# Patient Record
Sex: FEMALE | Race: WHITE | URBAN - METROPOLITAN AREA
[De-identification: names, ages, dates, MRNs, and addresses within clinical notes are randomized per-mention and may not be internally consistent; named-entity substitution may affect disease eponyms.]

---

## 2017-01-26 ENCOUNTER — OFFICE VISIT (OUTPATIENT)
Dept: PULMONOLOGY | Age: 82
End: 2017-01-26

## 2017-01-26 VITALS
BODY MASS INDEX: 25.52 KG/M2 | RESPIRATION RATE: 16 BRPM | WEIGHT: 130 LBS | TEMPERATURE: 98.4 F | HEIGHT: 60 IN | HEART RATE: 68 BPM | DIASTOLIC BLOOD PRESSURE: 64 MMHG | SYSTOLIC BLOOD PRESSURE: 120 MMHG | OXYGEN SATURATION: 95 %

## 2017-01-26 DIAGNOSIS — R91.8 LUNG NODULES: ICD-10-CM

## 2017-01-26 DIAGNOSIS — J90 PLEURAL EFFUSION: Primary | ICD-10-CM

## 2017-01-26 PROCEDURE — 99213 OFFICE O/P EST LOW 20 MIN: CPT | Performed by: INTERNAL MEDICINE

## 2017-01-26 RX ORDER — ALBUTEROL SULFATE 2.5 MG/3ML
2.5 SOLUTION RESPIRATORY (INHALATION) EVERY 6 HOURS PRN
COMMUNITY

## 2017-10-18 ENCOUNTER — HOSPITAL ENCOUNTER (OUTPATIENT)
Dept: CT IMAGING | Age: 82
Discharge: HOME | End: 2017-10-18
Attending: INTERNAL MEDICINE | Admitting: INTERNAL MEDICINE

## 2017-10-18 DIAGNOSIS — R91.1 SOLITARY PULMONARY NODULE: ICD-10-CM

## 2017-10-18 DIAGNOSIS — R91.8 LUNG NODULES: ICD-10-CM

## 2017-12-11 ENCOUNTER — OFFICE VISIT (OUTPATIENT)
Dept: PULMONOLOGY | Age: 82
End: 2017-12-11

## 2017-12-11 VITALS
SYSTOLIC BLOOD PRESSURE: 90 MMHG | HEIGHT: 60 IN | RESPIRATION RATE: 16 BRPM | OXYGEN SATURATION: 94 % | TEMPERATURE: 97.7 F | HEART RATE: 60 BPM | DIASTOLIC BLOOD PRESSURE: 40 MMHG

## 2017-12-11 DIAGNOSIS — R91.8 LUNG NODULES: ICD-10-CM

## 2017-12-11 DIAGNOSIS — J90 PLEURAL EFFUSION: Primary | ICD-10-CM

## 2017-12-11 PROCEDURE — 99213 OFFICE O/P EST LOW 20 MIN: CPT | Performed by: INTERNAL MEDICINE

## 2017-12-11 RX ORDER — QUETIAPINE FUMARATE 25 MG/1
12.5 TABLET, FILM COATED ORAL 2 TIMES DAILY
COMMUNITY

## 2017-12-11 RX ORDER — POTASSIUM CHLORIDE 750 MG/1
10 CAPSULE, EXTENDED RELEASE ORAL 2 TIMES DAILY
Status: ON HOLD | COMMUNITY
End: 2018-05-02 | Stop reason: SDUPTHER

## 2017-12-11 RX ORDER — FUROSEMIDE 40 MG/1
40 TABLET ORAL 2 TIMES DAILY
COMMUNITY
End: 2017-12-21 | Stop reason: DRUGHIGH

## 2017-12-11 RX ORDER — HALOPERIDOL 5 MG/ML
INJECTION INTRAMUSCULAR EVERY 6 HOURS PRN
COMMUNITY
End: 2018-01-29 | Stop reason: ALTCHOICE

## 2017-12-11 RX ORDER — GUAIFENESIN AND CODEINE PHOSPHATE 100; 10 MG/5ML; MG/5ML
5 SOLUTION ORAL 3 TIMES DAILY PRN
COMMUNITY

## 2017-12-11 RX ORDER — LOPERAMIDE HYDROCHLORIDE 2 MG/1
2 CAPSULE ORAL 4 TIMES DAILY PRN
Status: ON HOLD | COMMUNITY
End: 2018-05-07 | Stop reason: HOSPADM

## 2017-12-11 NOTE — PROGRESS NOTES
Chief complaint  This is a 80y.o. year old female  who presents with a chief complaint of   Chief Complaint   Patient presents with    Follow-up     ct       HPI  80-year-old woman with pleural effusion and lung nodules presents for follow-up. She is not a reliable historian. She tells me she is \"breathing through my stomach. \" She reports rare cough and sneezing. She denies lightheadedness. She is wheelchair bound. She requires help with all ADLs. Past Medical History:   Diagnosis Date    Atrial fibrillation ([facility] [address]     CAD (coronary artery disease)     CVA (cerebral vascular accident) ([facility] [address]     Depression     Hypertension    Dementia     Past Surgical History   History reviewed. No pertinent past surgical history. Current Outpatient Prescriptions   Medication Sig Dispense Refill    haloperidol lactate (HALDOL) 5 MG/ML injection Inject into the muscle every 6 hours as needed for Agitation      loperamide (IMODIUM) 2 MG capsule Take 2 mg by mouth 4 times daily as needed for Diarrhea      guaiFENesin-codeine (TUSSI-ORGANIDIN NR) 100-10 MG/5ML syrup Take 5 mLs by mouth 3 times daily as needed for Cough .       furosemide (LASIX) 40 MG tablet Take 40 mg by mouth 2 times daily      potassium chloride (MICRO-K) 10 MEQ extended release capsule Take 10 mEq by mouth 2 times daily      QUEtiapine (SEROQUEL) 25 MG tablet Take 25 mg by mouth 2 times daily      ipratropium (ATROVENT) 0.02 % nebulizer solution Take 0.5 mg by nebulization 4 times daily Every 2 hours as needed      albuterol (PROVENTIL) (2.5 MG/3ML) 0.083% nebulizer solution Take 2.5 mg by nebulization every 6 hours as needed for Wheezing      acetaminophen (TYLENOL) 325 MG tablet Take 650 mg by mouth every 4 hours as needed for Pain      bisacodyl (DULCOLAX) 10 MG suppository Place 10 mg rectally daily      bisacodyl (DULCOLAX) 5 MG EC tablet Take 5 mg by mouth daily as needed for Constipation      LORazepam (ATIVAN) 0.5 MG tablet Take 0.5 mg by mouth every 6 hours as needed for Anxiety      ondansetron (ZOFRAN-ODT) 4 MG disintegrating tablet Take 4 mg by mouth every 8 hours as needed for Nausea or Vomiting      clopidogrel (PLAVIX) 75 MG tablet Take 75 mg by mouth daily      digoxin (LANOXIN) 125 MCG tablet Take 125 mcg by mouth daily      docusate sodium (COLACE) 100 MG capsule Take 100 mg by mouth 2 times daily      DULoxetine (CYMBALTA) 30 MG extended release capsule Take 30 mg by mouth daily      famotidine (PEPCID) 20 MG tablet Take 20 mg by mouth 2 times daily      ferrous sulfate 325 (65 FE) MG tablet Take 325 mg by mouth daily (with breakfast)      lisinopril (PRINIVIL;ZESTRIL) 2.5 MG tablet Take 2.5 mg by mouth daily      nortriptyline (PAMELOR) 10 MG capsule Take 10 mg by mouth nightly      oxyCODONE (ROXICODONE) 5 MG immediate release tablet Take 5 mg by mouth every 4 hours as needed for Pain      polyethylene glycol (GLYCOLAX) powder Take 17 g by mouth daily      potassium chloride (KLOR-CON M) 10 MEQ extended release tablet Take 10 mEq by mouth 2 times daily      senna (SENOKOT) 8.6 MG tablet Take 1 tablet by mouth daily      SENSI-CARE SEPTI-SOFT (SEPTI SOFT) LIQD Apply topically as needed       No current facility-administered medications for this visit. Allergies   Allergen Reactions    Levofloxacin      Redness, itching       FAMILY HISTORY  History reviewed. No pertinent family history. Social History     Social History    Marital status:      Spouse name: N/A    Number of children: N/A    Years of education: N/A     Occupational History    Not on file. Social History Main Topics    Smoking status: Never Smoker    Smokeless tobacco: Never Used    Alcohol use No    Drug use: No    Sexual activity: Not on file     Other Topics Concern    Not on file     Social History Narrative    No narrative on file       There is no immunization history on file for this patient.     ROS:  GENERAL: No fevers, no chills  RESPIRATORY:  No shortness of breath, no wheezing, +rare cough      PHYSICAL EXAM:  Vitals:    12/11/17 1052 12/11/17 1128   BP: (!) 88/40 (!) 90/40   Site: Left Arm Left Arm   Position: Sitting Sitting   Cuff Size: Small Adult Small Adult   Pulse: 60    Resp: 16    Temp: 97.7 °F (36.5 °C)    TempSrc: Axillary    SpO2: 94%    Height: 5' (1.524 m)    on RA    Gen: Well developed; well nourished  Eyes: No scleral icterus. No conjunctival injection. ENT:  Oropharynx clear. External appearance of ears and nose normal.  Neck: Trachea midline. No obvious mass. No visible thyroid enlargement    Respiratory: Clear to auscultation bilaterally, no accessory muscle use  Cardiovascular: Regular rate and rhythm, no appreciable murmurs. No edema. Gastrointestinal: Soft, non-tender. No hernia  Skin: Warm and dry. No rashes or ulcers on visible areas. Normal texture and turgor  Lymphatic: No cervical LAD. No supraclavicular LAD. Musculoskeletal: No cyanosis, clubbing or joint deformity. Psychiatric: Normal mood and affect; exhibits normal insight and judgement        Images and reports of chest imaging were reviewed by me. My interpretation is:  Chest CT (10/25/16): Granulomas in the right upper lobe; calcified hilar and mediastinal nodes; density in the right major fissure; small left effusion; right apical nodule; several small nodular densities in the right upper lobe  Chest CT (10/18/17): Calcified hilar and mediastinal nodes; left pleural effusion; trace right pleural effusion; granulomas in the right upper lobe; atelectasis in the right lower lobe, left lower lobe and lingula; right upper lobe nodules (unchanged compared to 10/2016)     CXR (1/22/17- Radiology report from nursing home):  Reported as: Minimal left base infiltrate and slight left pleural effusion     ECHO (9/20/13- [facility] records)  Study Conclusions    - Left ventricle:  The cavity size was normal. There was mild    concentric hypertrophy. Systolic function was normal. The    estimated ejection fraction was in the range of 55% to    60%. Wall motion was normal; there were no regional wall    motion abnormalities. Aortic valve: The aortic valve    demonstrates incomplete closure. Mild to moderate    regurgitation. Mitral valve: Moderate to severe    regurgitation directed eccentrically and toward the free    wall. Left atrium: The atrium was massively dilated. Right    atrium: The atrium was moderately to markedly dilated.    Tricuspid valve: Moderate regurgitation. Pulmonary    arteries: PA peak pressure: 40mm Hg (S). Lab Results   Component Value Date    WBC 6.2 01/23/2011    HGB 13.3 01/23/2011    HCT 39.5 01/23/2011    MCV 87.6 01/23/2011     01/23/2011       Lab Results   Component Value Date    BNP 86.2 01/23/2011       Lab Results   Component Value Date    CREATININE 0.7 01/23/2011    BUN 20 (H) 01/23/2011     01/23/2011    K 4.2 01/23/2011     (H) 01/23/2011    CO2 27 01/23/2011         Assessment/Plan:80y.o. year old female presents for follow up. Pleural effusions- Has a small left pleural effusion and trace right pleural effusion. Findings are likely related to moderate to severe mitral regurgitation noted on previous echocardiogram. She has been on Lasix 40 mg daily. Given borderline blood pressure I have recommended that the dose be decreased to 20 mg daily. I have also recommended a consultation with cardiology. Will obtain chest x-ray in one year. Lung nodules- Has small right upper lobe nodules which have been stable between October 20016 and October 2017. No further follow-up is needed. I have also sent a note to inform nursing home of retained food in the patient's esophagus on chest CT. She will return for follow-up in one year.       Casi Santa MD  [facility] Pulmonology, Critical Care and Sleep

## 2017-12-21 ENCOUNTER — TELEPHONE (OUTPATIENT)
Dept: CARDIOLOGY CLINIC | Age: 82
End: 2017-12-21

## 2017-12-21 ENCOUNTER — OFFICE VISIT (OUTPATIENT)
Dept: CARDIOLOGY CLINIC | Age: 82
End: 2017-12-21

## 2017-12-21 VITALS
HEART RATE: 61 BPM | HEIGHT: 66 IN | DIASTOLIC BLOOD PRESSURE: 50 MMHG | OXYGEN SATURATION: 95 % | BODY MASS INDEX: 20.89 KG/M2 | WEIGHT: 130 LBS | SYSTOLIC BLOOD PRESSURE: 110 MMHG

## 2017-12-21 DIAGNOSIS — Z95.0 S/P PLACEMENT OF CARDIAC PACEMAKER: Chronic | ICD-10-CM

## 2017-12-21 DIAGNOSIS — I25.84 CORONARY ARTERY CALCIFICATION: ICD-10-CM

## 2017-12-21 DIAGNOSIS — R53.81 DEBILITY: ICD-10-CM

## 2017-12-21 DIAGNOSIS — I49.5 SSS (SICK SINUS SYNDROME) (HCC): Chronic | ICD-10-CM

## 2017-12-21 DIAGNOSIS — I34.0 NON-RHEUMATIC MITRAL REGURGITATION: Primary | Chronic | ICD-10-CM

## 2017-12-21 DIAGNOSIS — I48.20 CHRONIC ATRIAL FIBRILLATION (HCC): Chronic | ICD-10-CM

## 2017-12-21 DIAGNOSIS — I25.10 CORONARY ARTERY CALCIFICATION: ICD-10-CM

## 2017-12-21 PROCEDURE — 99204 OFFICE O/P NEW MOD 45 MIN: CPT | Performed by: INTERNAL MEDICINE

## 2017-12-21 PROCEDURE — 93000 ELECTROCARDIOGRAM COMPLETE: CPT | Performed by: INTERNAL MEDICINE

## 2017-12-21 RX ORDER — FUROSEMIDE 20 MG/1
20 TABLET ORAL DAILY
Status: ON HOLD | COMMUNITY
End: 2018-05-07 | Stop reason: HOSPADM

## 2017-12-21 RX ORDER — HYDROCHLOROTHIAZIDE 12.5 MG/1
12.5 CAPSULE, GELATIN COATED ORAL DAILY
COMMUNITY
End: 2017-12-21 | Stop reason: ALTCHOICE

## 2017-12-21 NOTE — PROGRESS NOTES
[address]      Cardiology Consult    Praful Carver  12/18/1931 December 21, 2017    Referring Physician: Fawn Braun MD  Reason for Referral: Mitral regurgitation    CC: \"I breathe through my stomach\"    HPI:  The patient is 80 y.o. female with a past medical history significant for atrial fibrillation, mitral regurgitation, SSS with PPM (generator change in 2013) and dementia with psychiatric disorder presents for management of mitral regurgitation and atrial fibrillation. She currently resides in [facility]. Her functional status is poor. She says she is unable to walk or complete ADLs independently. She is alert and oriented x 3 but is a difficult historian. Today, she does not seem to describe any cardiac complaints. An aide from the NH is present with the patient but does not have much additional information. The patient does not believe she has seen a cardiologist ever and [facility] her pacemaker gen-change in 2013. She is not on anticoagulation for her known chronic atrial fibrillation. She says that her lungs do not work and she breathes \"through her stomach. \" She does not appear in any distress. Nursing home paperwork lists her as a \"full code. \" She seems to comprehend end of life discussions and says she would not want chest compressions or be on a ventilator. No family is present and she says no one is designated as POA. Past Medical History:   Diagnosis Date    Anxiety     Atrial fibrillation ([address]     CAD (coronary artery disease)     Cerebrovascular disease     CVA (cerebral vascular accident) ([address]     Dementia     Depression     Dysphagia     Failure to thrive (0-17)     GERD (gastroesophageal reflux disease)     Hypertension     Muscle weakness (generalized)      History reviewed. No pertinent surgical history.   Family History   Problem Relation Age of Onset    Other Mother     Other Father      Social History   Substance Use Topics    Smoking status: Never Smoker    Smokeless tobacco: Never Used    Alcohol use No       Allergies   Allergen Reactions    Levofloxacin      Redness, itching     Current Outpatient Prescriptions   Medication Sig Dispense Refill    furosemide (LASIX) 20 MG tablet Take 20 mg by mouth daily      dextromethorphan-quiNIDine (NUEDEXTA) 20-10 MG CAPS per capsule Take 1 capsule by mouth every 12 hours For mood disorder      haloperidol lactate (HALDOL) 5 MG/ML injection Inject into the muscle every 6 hours as needed for Agitation      loperamide (IMODIUM) 2 MG capsule Take 2 mg by mouth 4 times daily as needed for Diarrhea      guaiFENesin-codeine (TUSSI-ORGANIDIN NR) 100-10 MG/5ML syrup Take 5 mLs by mouth 3 times daily as needed for Cough .       potassium chloride (MICRO-K) 10 MEQ extended release capsule Take 10 mEq by mouth 2 times daily      QUEtiapine (SEROQUEL) 25 MG tablet Take 25 mg by mouth 2 times daily      ipratropium (ATROVENT) 0.02 % nebulizer solution Take 0.5 mg by nebulization 4 times daily Every 2 hours as needed      albuterol (PROVENTIL) (2.5 MG/3ML) 0.083% nebulizer solution Take 2.5 mg by nebulization every 6 hours as needed for Wheezing      acetaminophen (TYLENOL) 325 MG tablet Take 650 mg by mouth every 4 hours as needed for Pain      bisacodyl (DULCOLAX) 10 MG suppository Place 10 mg rectally daily      bisacodyl (DULCOLAX) 5 MG EC tablet Take 5 mg by mouth daily as needed for Constipation      LORazepam (ATIVAN) 0.5 MG tablet Take 0.5 mg by mouth every 6 hours as needed for Anxiety      ondansetron (ZOFRAN-ODT) 4 MG disintegrating tablet Take 4 mg by mouth every 8 hours as needed for Nausea or Vomiting      clopidogrel (PLAVIX) 75 MG tablet Take 75 mg by mouth daily      docusate sodium (COLACE) 100 MG capsule Take 100 mg by mouth 2 times daily      DULoxetine (CYMBALTA) 30 MG extended release capsule Take 30 mg by mouth daily      famotidine (PEPCID) 20 MG tablet Take 20 mg by mouth 2 times daily      ferrous sulfate 325 (65 FE) MG tablet Take 325 mg by mouth daily (with breakfast)      lisinopril (PRINIVIL;ZESTRIL) 2.5 MG tablet Take 2.5 mg by mouth daily      nortriptyline (PAMELOR) 10 MG capsule Take 10 mg by mouth nightly      oxyCODONE (ROXICODONE) 5 MG immediate release tablet Take 5 mg by mouth every 4 hours as needed for Pain      polyethylene glycol (GLYCOLAX) powder Take 17 g by mouth daily      potassium chloride (KLOR-CON M) 10 MEQ extended release tablet Take 10 mEq by mouth 2 times daily      senna (SENOKOT) 8.6 MG tablet Take 1 tablet by mouth daily      SENSI-CARE SEPTI-SOFT (SEPTI SOFT) LIQD Apply topically as needed       No current facility-administered medications for this visit. Review of Systems:  · Constitutional: no unanticipated weight loss. No fevers, chills. · Eyes: No visual changes or diplopia. No scleral icterus. · ENT: No Headaches, hearing loss or vertigo. No mouth sores or sore throat. · Cardiovascular: as reviewed in HPI  · Respiratory: No cough or wheezing, no sputum production. No hematemesis. · Gastrointestinal: No abdominal pain, appetite loss, blood in stools. No change in bowel or bladder habits. · Genitourinary: No dysuria, trouble voiding, or hematuria. · Musculoskeletal:  No gait disturbance, no joint complaints. · Integumentary: No rash or pruritis. · Neurological: No headache, diplopia, change in muscle strength, numbness or tingling. · Psychiatric: No anxiety or depression. · Endocrine: No temperature intolerance. No excessive thirst, fluid intake, or urination. No tremor. · Hematologic/Lymphatic: No abnormal bruising or bleeding, blood clots or swollen lymph nodes. · Allergic/Immunologic: No nasal congestion or hives.     Physical Exam:   BP (!) 110/50 (Site: Left Arm, Position: Sitting, Cuff Size: Medium Adult)   Pulse 61   Ht 5' 6\" (1.676 m)   Wt 130 lb (59 kg)   SpO2 95% [facility], [address] [place], [place] [address]Jordan Gaspar [address]  Ph: [phone]  Fax: [phone]

## 2018-01-09 ENCOUNTER — HOSPITAL ENCOUNTER (OUTPATIENT)
Dept: GENERAL RADIOLOGY | Age: 83
Discharge: HOME | End: 2018-01-09
Attending: FAMILY MEDICINE | Admitting: FAMILY MEDICINE

## 2018-01-09 DIAGNOSIS — R13.12 DYSPHAGIA, OROPHARYNGEAL PHASE: ICD-10-CM

## 2018-01-09 DIAGNOSIS — R13.12 OROPHARYNGEAL DYSPHAGIA: ICD-10-CM

## 2018-01-09 NOTE — PROCEDURES
Puree, Honey cup, Nectar cup, Nectar  teaspoon, Thin cup    Positioning    Patient Position: Lateral and Patient Degrees: Upright in Videofluoroscopic Chair    Indicators of Oral Phase Dysfunction   Oral Phase - Major Contributing Deficits  Poor Mastication: Reg solid (prolonged ant at times incomplete mastication)  Weak Lingual Manipulation: Reg solid, Mechanical soft solid  Lingual Pumping:  (inconsistent across all consistencies)  Reduced Posterior Propulsion: Reg solid, Mechanical soft solid, Puree  Reduced Bolus Control: All  Decreased Bolus Cohesion: Reg solid, Mechanical soft solid  Lingual / Palatal Residue: Reg solid, Mechanical soft solid  Premature Bolus Loss to Pharynx: All  Delayed Trigger of Palatal Elevation: All      Indicators of Pharyngeal Phase Dysfunction  Pharyngeal Phase - Major Contributing Deficits  Delayed Swallow Initiation: All  Premature Spillage to Valleculae: All  Premature Spillage to Pyriform:  (thin liquids and inconsistent nectar thick liquids)  Reduced Laryngeal Elevation: All  Pooling Valleculae: All  Pooling Pyriform:  (thin liquids and inconsistently nectar thick liquids)  Deep Penetration Before: Thin cup (when alternated with foods)  Deep Penetration During: Thin cup (when alternated with foods)  Partial Retrieval (deep): Thin cup  No Cough Reflex:  (no cough to deep penetration or to residual penetration. Eventual aspiration risk)  Pharyngeal Residue - Valleculae:  (only trace barium lining post swallow of foods)      Upper Esophageal Phase   Upper Esophageal Screen  Esophageal Screen:  (DNT. Incidental note as test progressed retrograde flow below the PES post swallow)    Assessment:     IMPRESSIONS:    1. Behavior/Cognition  Alert; Cooperative ; Pt was verbally responsive but was a poor historian; able to follow one step commands; oriented to self     2. Dysphagia Diagnosis: Oropharyngeal Dysphagia    · Dysphagia characterized by prolonged, and at times incomplete,

## 2018-01-29 ENCOUNTER — OFFICE VISIT (OUTPATIENT)
Dept: CARDIOLOGY CLINIC | Age: 83
End: 2018-01-29

## 2018-01-29 ENCOUNTER — PROCEDURE VISIT (OUTPATIENT)
Dept: CARDIOLOGY CLINIC | Age: 83
End: 2018-01-29

## 2018-01-29 VITALS
HEART RATE: 86 BPM | BODY MASS INDEX: 21.21 KG/M2 | HEIGHT: 66 IN | WEIGHT: 132 LBS | OXYGEN SATURATION: 94 % | SYSTOLIC BLOOD PRESSURE: 122 MMHG | DIASTOLIC BLOOD PRESSURE: 60 MMHG

## 2018-01-29 DIAGNOSIS — I49.5 SSS (SICK SINUS SYNDROME) (HCC): Chronic | ICD-10-CM

## 2018-01-29 DIAGNOSIS — Z95.0 S/P PLACEMENT OF CARDIAC PACEMAKER: Chronic | ICD-10-CM

## 2018-01-29 DIAGNOSIS — I34.0 NON-RHEUMATIC MITRAL REGURGITATION: Chronic | ICD-10-CM

## 2018-01-29 DIAGNOSIS — I48.20 CHRONIC ATRIAL FIBRILLATION (HCC): Primary | Chronic | ICD-10-CM

## 2018-01-29 PROCEDURE — 99214 OFFICE O/P EST MOD 30 MIN: CPT | Performed by: INTERNAL MEDICINE

## 2018-01-29 PROCEDURE — 93280 PM DEVICE PROGR EVAL DUAL: CPT | Performed by: INTERNAL MEDICINE

## 2018-01-29 RX ORDER — HYDROCHLOROTHIAZIDE 12.5 MG/1
12.5 TABLET ORAL DAILY
Status: ON HOLD | COMMUNITY
End: 2018-05-07 | Stop reason: HOSPADM

## 2018-01-29 RX ORDER — DIGOXIN 125 MCG
125 TABLET ORAL DAILY
Status: ON HOLD | COMMUNITY
End: 2018-05-07 | Stop reason: HOSPADM

## 2018-01-29 RX ORDER — ALOE VERA/COLLAGEN
FOAM (ML) TOPICAL
COMMUNITY

## 2018-01-29 NOTE — PROGRESS NOTES
 nortriptyline (PAMELOR) 10 MG capsule Take 10 mg by mouth nightly      oxyCODONE (ROXICODONE) 5 MG immediate release tablet Take 5 mg by mouth every 4 hours as needed for Pain      polyethylene glycol (GLYCOLAX) powder Take 17 g by mouth daily      potassium chloride (KLOR-CON M) 10 MEQ extended release tablet Take 10 mEq by mouth 2 times daily      senna (SENOKOT) 8.6 MG tablet Take 1 tablet by mouth daily       No current facility-administered medications for this visit. Review of Systems:  · Constitutional: no unanticipated weight loss. No fevers, chills. · Eyes: No visual changes or diplopia. No scleral icterus. · ENT: No Headaches, hearing loss or vertigo. No mouth sores or sore throat. · Cardiovascular: as reviewed in HPI  · Respiratory: No cough or wheezing, no sputum production. No hematemesis. · Gastrointestinal: No abdominal pain, appetite loss, blood in stools. No change in bowel or bladder habits. · Genitourinary: No dysuria, trouble voiding, or hematuria. · Musculoskeletal:  No gait disturbance, no joint complaints. · Integumentary: No rash or pruritis. · Neurological: No headache, diplopia, change in muscle strength, numbness or tingling. · Psychiatric: No anxiety or depression. · Endocrine: No temperature intolerance. No excessive thirst, fluid intake, or urination. No tremor. · Hematologic/Lymphatic: No abnormal bruising or bleeding, blood clots or swollen lymph nodes. · Allergic/Immunologic: No nasal congestion or hives. Physical Exam:   /60 (Site: Left Arm, Position: Sitting, Cuff Size: Medium Adult)   Pulse 86   Ht 5' 6\" (1.676 m)   Wt 132 lb (59.9 kg)   SpO2 94%   BMI 21.31 kg/m²   Wt Readings from Last 3 Encounters:   01/29/18 132 lb (59.9 kg)   12/21/17 130 lb (59 kg)   01/26/17 130 lb (59 kg)     Constitutional: She is oriented to person, place, and time. She appears elderly and chronically ill. No acute distress. Head: Normocephalic and atraumatic. Pupils equal and round. Neck: Neck supple. No JVP or carotid bruit appreciated. No mass and no thyromegaly present. No lymphadenopathy present. Cardiovascular: Normal rate. III/VI holosystolic murmur. Pulmonary/Chest: Effort normal. Diminished breath sounds at bases. No respiratory distress. She has no wheezes, rhonchi or rales. Abdominal: Soft, non-tender. Bowel sounds are normal. She exhibits no organomegaly, mass or bruit. Extremities: No edema, cyanosis, or clubbing. Pulses are 2+ radial/carotid bilaterally. Neurological: No gross cranial nerve deficit. Coordination normal.   Skin: Skin is warm and dry. There is no rash or diaphoresis. Psychiatric: She has a depressed mood and restricted affect. Her speech is normal and behavior is normal. Difficult historian. Lab Review:   FLP:  No results found for: TRIG, HDL, LDLCALC, LDLDIRECT, LABVLDL   9/21/2013 ([facility])   TRIG 89, HDL 31, LDLCALC 87     BUN/Creatinine:    Lab Results   Component Value Date    BUN 20 01/23/2011    CREATININE 0.7 01/23/2011 9/2013  ([facility])  BUN 11, CREATININE 0.7    EKG Interpretation: 12/21/17  Demand ventricular pacemaker. Underlying rhythm is likely atrial fibrillation. Nonspecific ST-T wave abnormality. Image Review:     Echo: 9/2013 [facility])  Left ventricle: The cavity size was normal. There was mild concentric hypertrophy. Systolic function was normal. The  estimated ejection fraction was in the range of 55% to 60%. Wall motion was normal; there were no regional wall  motion abnormalities. Aortic valve: The aortic valve demonstrates incomplete closure. Mild to moderate  Regurgitation. Mitral valve: Moderate to severe regurgitation directed eccentrically and toward the free wall. Left atrium: The atrium was massively dilated. Right atrium: The atrium was moderately to markedly dilated. Tricuspid valve: Moderate regurgitation. Pulmonary arteries: PA peak pressure: 40mm Hg (S).     Stress Test: 2/2011 [facility])  No

## 2018-05-01 PROBLEM — N17.9 ACUTE KIDNEY INJURY (HCC): Status: ACTIVE | Noted: 2018-05-01
